# Patient Record
Sex: MALE | Race: ASIAN | NOT HISPANIC OR LATINO | URBAN - METROPOLITAN AREA
[De-identification: names, ages, dates, MRNs, and addresses within clinical notes are randomized per-mention and may not be internally consistent; named-entity substitution may affect disease eponyms.]

---

## 2017-02-10 ENCOUNTER — COMMUNICATION - HEALTHEAST (OUTPATIENT)
Dept: INTERNAL MEDICINE | Facility: CLINIC | Age: 82
End: 2017-02-10

## 2017-02-10 DIAGNOSIS — I10 UNSPECIFIED ESSENTIAL HYPERTENSION: ICD-10-CM

## 2017-04-06 ENCOUNTER — OFFICE VISIT - HEALTHEAST (OUTPATIENT)
Dept: INTERNAL MEDICINE | Facility: CLINIC | Age: 82
End: 2017-04-06

## 2017-04-06 DIAGNOSIS — I10 ESSENTIAL HYPERTENSION: ICD-10-CM

## 2017-04-06 DIAGNOSIS — E78.5 HYPERLIPIDEMIA: ICD-10-CM

## 2017-04-06 DIAGNOSIS — D50.9 IRON DEFICIENCY ANEMIA: ICD-10-CM

## 2017-04-06 LAB
CHOLEST SERPL-MCNC: 219 MG/DL
FASTING STATUS PATIENT QL REPORTED: YES
HDLC SERPL-MCNC: 48 MG/DL
LDLC SERPL CALC-MCNC: 151 MG/DL
TRIGL SERPL-MCNC: 98 MG/DL

## 2017-04-06 ASSESSMENT — MIFFLIN-ST. JEOR: SCORE: 1322.99

## 2017-04-07 ENCOUNTER — COMMUNICATION - HEALTHEAST (OUTPATIENT)
Dept: INTERNAL MEDICINE | Facility: CLINIC | Age: 82
End: 2017-04-07

## 2017-06-14 ENCOUNTER — OFFICE VISIT - HEALTHEAST (OUTPATIENT)
Dept: INTERNAL MEDICINE | Facility: CLINIC | Age: 82
End: 2017-06-14

## 2017-06-14 DIAGNOSIS — I10 ESSENTIAL HYPERTENSION: ICD-10-CM

## 2017-06-14 DIAGNOSIS — R21 RASH/SKIN ERUPTION: ICD-10-CM

## 2017-06-14 DIAGNOSIS — T78.40XA ALLERGY: ICD-10-CM

## 2017-09-14 ENCOUNTER — COMMUNICATION - HEALTHEAST (OUTPATIENT)
Dept: INTERNAL MEDICINE | Facility: CLINIC | Age: 82
End: 2017-09-14

## 2017-09-28 ENCOUNTER — COMMUNICATION - HEALTHEAST (OUTPATIENT)
Dept: INTERNAL MEDICINE | Facility: CLINIC | Age: 82
End: 2017-09-28

## 2018-02-25 ENCOUNTER — COMMUNICATION - HEALTHEAST (OUTPATIENT)
Dept: INTERNAL MEDICINE | Facility: CLINIC | Age: 83
End: 2018-02-25

## 2018-02-28 ENCOUNTER — OFFICE VISIT - HEALTHEAST (OUTPATIENT)
Dept: INTERNAL MEDICINE | Facility: CLINIC | Age: 83
End: 2018-02-28

## 2018-02-28 DIAGNOSIS — J18.9 PNEUMONIA, COMMUNITY ACQUIRED: ICD-10-CM

## 2018-02-28 DIAGNOSIS — I10 ESSENTIAL HYPERTENSION: ICD-10-CM

## 2018-02-28 DIAGNOSIS — B37.0 ORAL THRUSH: ICD-10-CM

## 2018-02-28 ASSESSMENT — MIFFLIN-ST. JEOR: SCORE: 1350.21

## 2018-06-04 ENCOUNTER — OFFICE VISIT - HEALTHEAST (OUTPATIENT)
Dept: INTERNAL MEDICINE | Facility: CLINIC | Age: 83
End: 2018-06-04

## 2018-06-04 ENCOUNTER — RECORDS - HEALTHEAST (OUTPATIENT)
Dept: GENERAL RADIOLOGY | Facility: CLINIC | Age: 83
End: 2018-06-04

## 2018-06-04 DIAGNOSIS — I10 ESSENTIAL HYPERTENSION: ICD-10-CM

## 2018-06-04 DIAGNOSIS — M25.532 LEFT WRIST PAIN: ICD-10-CM

## 2018-06-04 DIAGNOSIS — M25.532 PAIN IN LEFT WRIST: ICD-10-CM

## 2018-06-04 DIAGNOSIS — M79.671 RIGHT FOOT PAIN: ICD-10-CM

## 2018-06-04 DIAGNOSIS — M79.671 PAIN IN RIGHT FOOT: ICD-10-CM

## 2018-06-04 LAB
ANION GAP SERPL CALCULATED.3IONS-SCNC: 13 MMOL/L (ref 5–18)
BUN SERPL-MCNC: 13 MG/DL (ref 8–28)
C REACTIVE PROTEIN LHE: 4.8 MG/DL (ref 0–0.8)
CALCIUM SERPL-MCNC: 9.6 MG/DL (ref 8.5–10.5)
CHLORIDE BLD-SCNC: 105 MMOL/L (ref 98–107)
CO2 SERPL-SCNC: 24 MMOL/L (ref 22–31)
CREAT SERPL-MCNC: 0.91 MG/DL (ref 0.7–1.3)
ERYTHROCYTE [SEDIMENTATION RATE] IN BLOOD BY WESTERGREN METHOD: 12 MM/HR (ref 0–15)
GFR SERPL CREATININE-BSD FRML MDRD: >60 ML/MIN/1.73M2
GLUCOSE BLD-MCNC: 128 MG/DL (ref 70–125)
POTASSIUM BLD-SCNC: 3.8 MMOL/L (ref 3.5–5)
SODIUM SERPL-SCNC: 142 MMOL/L (ref 136–145)
URATE SERPL-MCNC: 7.5 MG/DL (ref 3–8)

## 2018-06-04 ASSESSMENT — MIFFLIN-ST. JEOR: SCORE: 1350.21

## 2018-06-05 ENCOUNTER — COMMUNICATION - HEALTHEAST (OUTPATIENT)
Dept: INTERNAL MEDICINE | Facility: CLINIC | Age: 83
End: 2018-06-05

## 2018-07-26 ENCOUNTER — OFFICE VISIT - HEALTHEAST (OUTPATIENT)
Dept: INTERNAL MEDICINE | Facility: CLINIC | Age: 83
End: 2018-07-26

## 2018-07-26 DIAGNOSIS — I10 ESSENTIAL HYPERTENSION: ICD-10-CM

## 2018-07-26 DIAGNOSIS — M25.532 LEFT WRIST PAIN: ICD-10-CM

## 2018-07-26 DIAGNOSIS — M79.671 RIGHT FOOT PAIN: ICD-10-CM

## 2018-07-26 DIAGNOSIS — E78.5 HYPERLIPIDEMIA: ICD-10-CM

## 2018-07-26 ASSESSMENT — MIFFLIN-ST. JEOR: SCORE: 1332.07

## 2018-10-30 ENCOUNTER — COMMUNICATION - HEALTHEAST (OUTPATIENT)
Dept: INTERNAL MEDICINE | Facility: CLINIC | Age: 83
End: 2018-10-30

## 2018-10-30 ENCOUNTER — OFFICE VISIT - HEALTHEAST (OUTPATIENT)
Dept: INTERNAL MEDICINE | Facility: CLINIC | Age: 83
End: 2018-10-30

## 2018-10-30 DIAGNOSIS — R42 DIZZINESS AND GIDDINESS: ICD-10-CM

## 2018-10-30 DIAGNOSIS — I10 ESSENTIAL HYPERTENSION: ICD-10-CM

## 2018-10-30 DIAGNOSIS — E78.5 HYPERLIPIDEMIA: ICD-10-CM

## 2018-10-30 ASSESSMENT — MIFFLIN-ST. JEOR: SCORE: 1318.46

## 2019-01-01 ENCOUNTER — SURGERY - HEALTHEAST (OUTPATIENT)
Dept: SURGERY | Facility: CLINIC | Age: 84
End: 2019-01-01

## 2019-01-01 ENCOUNTER — COMMUNICATION - HEALTHEAST (OUTPATIENT)
Dept: INTERNAL MEDICINE | Facility: CLINIC | Age: 84
End: 2019-01-01

## 2019-01-01 ENCOUNTER — ANESTHESIA - HEALTHEAST (OUTPATIENT)
Dept: SURGERY | Facility: CLINIC | Age: 84
End: 2019-01-01

## 2019-01-01 ENCOUNTER — OFFICE VISIT - HEALTHEAST (OUTPATIENT)
Dept: INTERNAL MEDICINE | Facility: CLINIC | Age: 84
End: 2019-01-01

## 2019-01-01 ENCOUNTER — RECORDS - HEALTHEAST (OUTPATIENT)
Dept: LAB | Facility: CLINIC | Age: 84
End: 2019-01-01

## 2019-01-01 ENCOUNTER — HOSPITAL ENCOUNTER (OUTPATIENT)
Dept: EMERGENCY MEDICINE | Facility: CLINIC | Age: 84
Discharge: HOME | End: 2019-10-14

## 2019-01-01 ENCOUNTER — RECORDS - HEALTHEAST (OUTPATIENT)
Dept: ADMINISTRATIVE | Facility: OTHER | Age: 84
End: 2019-01-01

## 2019-01-01 ENCOUNTER — HOME CARE/HOSPICE - HEALTHEAST (OUTPATIENT)
Dept: HOSPICE | Facility: HOSPICE | Age: 84
End: 2019-01-01

## 2019-01-01 DIAGNOSIS — E78.00 PURE HYPERCHOLESTEROLEMIA: ICD-10-CM

## 2019-01-01 DIAGNOSIS — I10 ESSENTIAL HYPERTENSION: ICD-10-CM

## 2019-01-01 LAB
ANION GAP SERPL CALCULATED.3IONS-SCNC: 8 MMOL/L (ref 5–18)
BASOPHILS # BLD AUTO: 0.2 THOU/UL (ref 0–0.2)
BASOPHILS NFR BLD AUTO: 1 % (ref 0–2)
BUN SERPL-MCNC: 18 MG/DL (ref 8–28)
CALCIUM SERPL-MCNC: 8.6 MG/DL (ref 8.5–10.5)
CHLORIDE BLD-SCNC: 100 MMOL/L (ref 98–107)
CO2 SERPL-SCNC: 28 MMOL/L (ref 22–31)
CREAT SERPL-MCNC: 0.77 MG/DL (ref 0.7–1.3)
EOSINOPHIL # BLD AUTO: 0.5 THOU/UL (ref 0–0.4)
EOSINOPHIL NFR BLD AUTO: 5 % (ref 0–6)
ERYTHROCYTE [DISTWIDTH] IN BLOOD BY AUTOMATED COUNT: 14.3 % (ref 11–14.5)
GFR SERPL CREATININE-BSD FRML MDRD: >60 ML/MIN/1.73M2
GLUCOSE BLD-MCNC: 120 MG/DL (ref 70–125)
HCT VFR BLD AUTO: 31.5 % (ref 40–54)
HGB BLD-MCNC: 9.3 G/DL (ref 14–18)
LYMPHOCYTES # BLD AUTO: 1 THOU/UL (ref 0.8–4.4)
LYMPHOCYTES NFR BLD AUTO: 9 % (ref 20–40)
MAGNESIUM SERPL-MCNC: 2.4 MG/DL (ref 1.8–2.6)
MCH RBC QN AUTO: 25.8 PG (ref 27–34)
MCHC RBC AUTO-ENTMCNC: 29.5 G/DL (ref 32–36)
MCV RBC AUTO: 87 FL (ref 80–100)
MONOCYTES # BLD AUTO: 0.9 THOU/UL (ref 0–0.9)
MONOCYTES NFR BLD AUTO: 8 % (ref 2–10)
NEUTROPHILS # BLD AUTO: 8.3 THOU/UL (ref 2–7.7)
NEUTROPHILS NFR BLD AUTO: 75 % (ref 50–70)
PHOSPHATE SERPL-MCNC: 3.2 MG/DL (ref 2.5–4.5)
PLATELET # BLD AUTO: 770 THOU/UL (ref 140–440)
PMV BLD AUTO: 9.1 FL (ref 8.5–12.5)
POTASSIUM BLD-SCNC: 3.8 MMOL/L (ref 3.5–5)
RBC # BLD AUTO: 3.61 MILL/UL (ref 4.4–6.2)
SODIUM SERPL-SCNC: 136 MMOL/L (ref 136–145)
WBC: 11 THOU/UL (ref 4–11)

## 2019-01-01 ASSESSMENT — MIFFLIN-ST. JEOR
SCORE: 1283.53
SCORE: 1282.62
SCORE: 1296.68
SCORE: 1274
SCORE: 1288.52
SCORE: 1282.62
SCORE: 1286.25
SCORE: 1287.61
SCORE: 1307.12
SCORE: 1283.53
SCORE: 1296.68
SCORE: 1296.68
SCORE: 1288.52
SCORE: 1304.85
SCORE: 1270.83
SCORE: 1296.68
SCORE: 1271.28
SCORE: 1283.53
SCORE: 1271.28
SCORE: 1287.61
SCORE: 1270.83
SCORE: 1274
SCORE: 1270.55
SCORE: 1336.6
SCORE: 1336.6
SCORE: 1307.12
SCORE: 1296.68
SCORE: 1304.85
SCORE: 1354.74
SCORE: 1296.68
SCORE: 1270.55
SCORE: 1286.25
SCORE: 1354.74
SCORE: 1283.53

## 2020-01-01 ENCOUNTER — HOME CARE/HOSPICE - HEALTHEAST (OUTPATIENT)
Dept: HOSPICE | Facility: HOSPICE | Age: 85
End: 2020-01-01

## 2021-05-25 ENCOUNTER — RECORDS - HEALTHEAST (OUTPATIENT)
Dept: ADMINISTRATIVE | Facility: CLINIC | Age: 86
End: 2021-05-25

## 2021-05-28 ENCOUNTER — RECORDS - HEALTHEAST (OUTPATIENT)
Dept: ADMINISTRATIVE | Facility: CLINIC | Age: 86
End: 2021-05-28

## 2021-05-29 ENCOUNTER — RECORDS - HEALTHEAST (OUTPATIENT)
Dept: ADMINISTRATIVE | Facility: CLINIC | Age: 86
End: 2021-05-29

## 2021-05-30 VITALS — WEIGHT: 170 LBS | HEIGHT: 65 IN | BODY MASS INDEX: 28.32 KG/M2

## 2021-05-30 NOTE — TELEPHONE ENCOUNTER
Refill Approved    Rx renewed per Medication Renewal Policy. Medication was last renewed on 2/25/19.    Last office visit 3/11/19    Arpit Fritz, [facility] Triage/Med Refill 7/30/2019     Requested Prescriptions   Pending Prescriptions Disp Refills     amLODIPine (NORVASC) 2.5 MG tablet [Pharmacy Med Name: AMLODIPINE BESYLATE 2.5MG TABLETS] 30 tablet 0     Sig: TAKE 1 TABLET BY MOUTH EVERY DAY       Calcium-Channel Blockers Protocol Passed - 7/30/2019  3:14 AM        Passed - PCP or prescribing provider visit in past 12 months or next 3 months     Last office visit with prescriber/PCP: 3/11/2019 Ovidio Villanueva MD OR same dept: 3/11/2019 Ovidio Villanueva MD OR same specialty: 3/11/2019 Ovidio Villanueva MD  Last physical: Visit date not found Last MTM visit: Visit date not found   Next visit within 3 mo: Visit date not found  Next physical within 3 mo: Visit date not found  Prescriber OR PCP: Ovidio Villanueva MD  Last diagnosis associated with med order: 1. Essential hypertension  - amLODIPine (NORVASC) 2.5 MG tablet [Pharmacy Med Name: AMLODIPINE BESYLATE 2.5MG TABLETS]; TAKE 1 TABLET BY MOUTH EVERY DAY  Dispense: 30 tablet; Refill: 0    If protocol passes may refill for 12 months if within 3 months of last provider visit (or a total of 15 months).             Passed - Blood pressure filed in past 12 months     BP Readings from Last 1 Encounters:   03/11/19 140/80

## 2021-05-31 ENCOUNTER — RECORDS - HEALTHEAST (OUTPATIENT)
Dept: ADMINISTRATIVE | Facility: CLINIC | Age: 86
End: 2021-05-31

## 2021-05-31 VITALS — BODY MASS INDEX: 28.16 KG/M2 | WEIGHT: 169.25 LBS

## 2021-05-31 NOTE — ANESTHESIA PREPROCEDURE EVALUATION
Anesthesia Evaluation      Patient summary reviewed   No history of anesthetic complications     Airway   Mallampati: III  Neck ROM: full   Pulmonary    (+) decreased breath sounds (B/L),   (-) not a smoker                         Cardiovascular   (+) hypertension, , hypercholesterolemia,     (-) angina  ECG reviewed (NSR)  Rhythm: regular     ROS comment: Walks without assistance per pt     Neuro/Psych - negative ROS     Endo/Other    (+) arthritis,      Comments: Failure to thrive    GI/Hepatic/Renal    (+) esophageal disease (dysphagia),      Comments: Hx of small bowel obstruction - treated (D/C 8/15/19)  Had EGD yesterday     Other findings: Results for orders placed or performed during the hospital encounter of 08/21/19  -Basic Metabolic Panel      Result                      Value             Ref Range           Sodium                      138               136 - 145 mm*       Potassium                   3.9               3.5 - 5.0 mm*       Chloride                    103               98 - 107 mmo*       CO2                         19 (L)            22 - 31 mmol*       Anion Gap, Calculation      16                5 - 18 mmol/L       Glucose                     97                70 - 125 mg/*       Calcium                     9.2               8.5 - 10.5 m*       BUN                         15                8 - 28 mg/dL        Creatinine                  0.81              0.70 - 1.30 *       GFR MDRD Af Amer            >60               >60 mL/min/1*       GFR MDRD Non Af Amer        >60               >60 mL/min/1*    Lab Results       Component                Value               Date                       WBC                      9.3                 08/23/2019                 HGB                      16.0                08/23/2019                 HCT                      49.9                08/23/2019                 MCV                      81                  08/23/2019                 PLT                       363                 08/23/2019                  Dental    (+) upper dentures, poor dentition and lower dentures                         Anesthesia Plan  Planned anesthetic: general endotracheal    ASA 3 - emergent   Induction: intravenous   Anesthetic plan and risks discussed with: patient  Anesthesia plan special considerations: antiemetics,   Post-op plan: routine recovery

## 2021-05-31 NOTE — ANESTHESIA POSTPROCEDURE EVALUATION
Patient: Rian Vasquez  ESOPHAGOGASTRODUODENOSCOPY (EGD) WITH BIOPSIES  Anesthesia type: No value filed.    Patient location: Telemetry/Step Down Unit  Last vitals:   Vitals Value Taken Time   /60 8/23/2019  8:00 PM   Temp 36.8  C (98.3  F) 8/23/2019  8:00 PM   Pulse 86 8/23/2019  9:03 PM   Resp 16 8/23/2019  8:00 PM   SpO2 95 % 8/23/2019  8:00 PM   Vitals shown include unvalidated device data.  Post vital signs: stable  Level of consciousness: awake and responds to simple questions  Post-anesthesia pain: pain controlled  Post-anesthesia nausea and vomiting: no  Pulmonary: unassisted, return to baseline  Cardiovascular: stable and blood pressure at baseline  Hydration: adequate  Anesthetic events: no    QCDR Measures:  ASA# 11 - Sujata-op Cardiac Arrest: ASA11B - Patient did NOT experience unanticipated cardiac arrest  ASA# 12 - Sujata-op Mortality Rate: ASA12B - Patient did NOT die  ASA# 13 - PACU Re-Intubation Rate: ASA13B - Patient did NOT require a new airway mgmt  ASA# 10 - Composite Anes Safety: ASA10A - No serious adverse event    Additional Notes:

## 2021-05-31 NOTE — ANESTHESIA CARE TRANSFER NOTE
Last vitals:   Vitals:    08/24/19 1311   BP: (!) 188/72   Pulse: 82   Resp: 12   Temp: 37.4  C (99.3  F)   SpO2: 100%     Patient's level of consciousness is drowsy  Spontaneous respirations: yes  Maintains airway independently: yes  Dentition unchanged: yes  Oropharynx: oropharynx clear of all foreign objects    QCDR Measures:  ASA# 20 - Surgical Safety Checklist: WHO surgical safety checklist completed prior to induction    PQRS# 430 - Adult PONV Prevention: 4558F - Pt received => 2 anti-emetic agents (different classes) preop & intraop  ASA# 8 - Peds PONV Prevention: NA - Not pediatric patient, not GA or 2 or more risk factors NOT present  PQRS# 424 - Sujata-op Temp Management: 4559F - At least one body temp DOCUMENTED => 35.5C or 95.9F within required timeframe  PQRS# 426 - PACU Transfer Protocol: - Transfer of care checklist used  ASA# 14 - Acute Post-op Pain: ASA14B - Patient did NOT experience pain >= 7 out of 10

## 2021-05-31 NOTE — ANESTHESIA CARE TRANSFER NOTE
Last vitals:   Vitals:    08/23/19 0842   BP: 170/73   Pulse: 92   Resp: 24   Temp: 37.2  C (99  F)   SpO2: 100%     Patient's level of consciousness is awake  Spontaneous respirations: yes  Maintains airway independently: yes  Dentition unchanged: yes  Oropharynx: oropharynx clear of all foreign objects    QCDR Measures:  ASA# 20 - Surgical Safety Checklist: WHO surgical safety checklist completed prior to induction    PQRS# 430 - Adult PONV Prevention: 4558F - Pt received => 2 anti-emetic agents (different classes) preop & intraop  ASA# 8 - Peds PONV Prevention: NA - Not pediatric patient, not GA or 2 or more risk factors NOT present  PQRS# 424 - Sujata-op Temp Management: 4559F - At least one body temp DOCUMENTED => 35.5C or 95.9F within required timeframe  PQRS# 426 - PACU Transfer Protocol: - Transfer of care checklist used  ASA# 14 - Acute Post-op Pain: ASA14B - Patient did NOT experience pain >= 7 out of 10

## 2021-05-31 NOTE — ANESTHESIA PREPROCEDURE EVALUATION
Anesthesia Evaluation      Patient summary reviewed   No history of anesthetic complications     Airway   Mallampati: III  Neck ROM: full   Pulmonary    (+) decreased breath sounds (B/L),   (-) not a smoker                         Cardiovascular   (+) hypertension, , hypercholesterolemia,     (-) angina  ECG reviewed (NSR)  Rhythm: regular     ROS comment: Walks without assistance per pt     Neuro/Psych - negative ROS     Endo/Other    (+) arthritis,      Comments: Failure to thrive    GI/Hepatic/Renal    (+) esophageal disease (dysphagia),      Comments: Hx of small bowel obstruction - treated (D/C 8/15/19)     Other findings:     Results for JEM ANAND (MRN [id number]) as of 8/23/2019 8/22/2019 07:46  Sodium: 139  Potassium: 3.9  Chloride: 104  CO2: 27  Anion Gap, Calculation: 8  BUN: 11  Creatinine: 0.88  GFR MDRD Af Amer: >60  GFR MDRD Non Af Amer: >60  Calcium: 8.8  AST: 25  ALT: 39  ALBUMIN: 3.0 (L)  Protein, Total: 6.6  Alkaline Phosphatase: 196 (H)  Bilirubin, Total: 0.8  Bilirubin, Direct: 0.3  Glucose: 104  WBC: 13.9 (H)  RBC: 5.67  Hemoglobin: 14.5  Hematocrit: 45.6  MCV: 80  MCH: 25.6 (L)  MCHC: 31.8 (L)  RDW: 14.6 (H)  Platelets: 320  MPV: 8.7    8/22/2019 10:59  Lactic Acid: 1.3        Dental    (+) upper dentures, poor dentition and lower dentures                       Anesthesia Plan  Planned anesthetic: MAC    ASA 3     Anesthetic plan and risks discussed with: patient  Anesthesia plan special considerations: antiemetics,   Post-op plan: routine recovery

## 2021-05-31 NOTE — ANESTHESIA POSTPROCEDURE EVALUATION
Patient: Rian aVsquez  LYSIS, ADHESIONS, LAPAROSCOPIC, INSERTION, JEJUNOSTOMY TUBE, LAPAROSCOPIC  Anesthesia type: general    Patient location: PACU  Last vitals:   Vitals Value   /68   Temp 36.8  C (98.2  F)   Pulse 100   Resp 16   SpO2 94 %   Vitals shown include unvalidated device data.  Post vital signs: stable  Level of consciousness: awake and responds to simple questions  Post-anesthesia pain: pain controlled  Post-anesthesia nausea and vomiting: no  Pulmonary: unassisted, return to baseline  Cardiovascular: stable and blood pressure at baseline  Hydration: adequate  Anesthetic events: no    QCDR Measures:  ASA# 11 - Sujata-op Cardiac Arrest: ASA11B - Patient did NOT experience unanticipated cardiac arrest  ASA# 12 - Sujata-op Mortality Rate: ASA12B - Patient did NOT die  ASA# 13 - PACU Re-Intubation Rate: ASA13B - Patient did NOT require a new airway mgmt  ASA# 10 - Composite Anes Safety: ASA10A - No serious adverse event    Additional Notes:

## 2021-06-01 VITALS — WEIGHT: 165 LBS | BODY MASS INDEX: 25.9 KG/M2 | HEIGHT: 67 IN

## 2021-06-01 VITALS — HEIGHT: 67 IN | BODY MASS INDEX: 26.53 KG/M2 | WEIGHT: 169 LBS

## 2021-06-01 VITALS — WEIGHT: 169 LBS | HEIGHT: 67 IN | BODY MASS INDEX: 26.53 KG/M2

## 2021-06-02 ENCOUNTER — RECORDS - HEALTHEAST (OUTPATIENT)
Dept: ADMINISTRATIVE | Facility: CLINIC | Age: 86
End: 2021-06-02

## 2021-06-02 VITALS — HEIGHT: 67 IN | BODY MASS INDEX: 26.06 KG/M2 | WEIGHT: 166 LBS

## 2021-06-02 VITALS — HEIGHT: 67 IN | WEIGHT: 162 LBS | BODY MASS INDEX: 25.43 KG/M2

## 2021-06-02 VITALS — BODY MASS INDEX: 26.06 KG/M2 | WEIGHT: 166 LBS | HEIGHT: 67 IN

## 2021-06-02 NOTE — TELEPHONE ENCOUNTER
Refill Approved    Rx renewed per Medication Renewal Policy. Medication was last renewed on 9/4/19 .    Lori Almonte, [facility] Triage/Med Refill 10/26/2019     Requested Prescriptions   Pending Prescriptions Disp Refills     amLODIPine (NORVASC) 2.5 MG tablet [Pharmacy Med Name: AMLODIPINE BESYLATE 2.5MG TABLETS] 90 tablet 0     Sig: TAKE 1 TABLET BY MOUTH EVERY DAY       Calcium-Channel Blockers Protocol Passed - 10/26/2019  3:13 AM        Passed - PCP or prescribing provider visit in past 12 months or next 3 months     Last office visit with prescriber/PCP: 3/11/2019 Ovidio Villanueva MD OR same dept: 3/11/2019 Ovidio Villanueva MD OR same specialty: 3/11/2019 Ovidio Villanueva MD  Last physical: Visit date not found Last MTM visit: Visit date not found   Next visit within 3 mo: Visit date not found  Next physical within 3 mo: Visit date not found  Prescriber OR PCP: Ovdiio Villanueva MD  Last diagnosis associated with med order: 1. Essential hypertension  - amLODIPine (NORVASC) 2.5 MG tablet [Pharmacy Med Name: AMLODIPINE BESYLATE 2.5MG TABLETS]; TAKE 1 TABLET BY MOUTH EVERY DAY  Dispense: 90 tablet; Refill: 0    If protocol passes may refill for 12 months if within 3 months of last provider visit (or a total of 15 months).             Passed - Blood pressure filed in past 12 months     BP Readings from Last 1 Encounters:   10/03/19 140/64

## 2021-06-03 VITALS
WEIGHT: 151.6 LBS | HEIGHT: 67 IN | BODY MASS INDEX: 23.79 KG/M2 | WEIGHT: 151.6 LBS | BODY MASS INDEX: 23.79 KG/M2 | HEIGHT: 67 IN

## 2021-06-09 NOTE — PROGRESS NOTES
Office Visit - Follow Up   Rian Vasquez   89 y.o. male    Date of Visit: 4/6/2017    Chief Complaint   Patient presents with     Follow-up     Fasting for labs today        Assessment and Plan   1. Essential hypertension  Controlled.  Continue amlodipine, controlled.  Metoprolol and hydrochlorothiazide.  Check basic metabolic profile.  - Basic Metabolic Panel    2. Hyperlipidemia  Does not take medication but controlled.  Check lipids TSH and liver.  - Lipid Cascade  - Thyroid Stimulating Hormone (TSH)  - Hepatic Profile    3. Iron deficiency anemia  Has iron deficiency anemia but now corrected by iron supplement.  Takes iron supplement daily.  Check CBC, ferritin and iron.  - HM2(CBC w/o Differential)  - Ferritin  - Iron    Doing well for his advanced age of 89.  Does not look like his age of 89 years old.  Very active.  Still dances in the ballroom 3 times a week.      Follow up in 4 months.       History of Present Illness   This 89 y.o. old male is here for his follow-up.  Has hypertension controlled by his medications.  Has hyperlipidemia controlled without medication.  Has iron deficiency anemia but now corrected.  Continues to take iron supplement daily.  Still very active at his advanced age of 89 years old.  He dances in a ballroom 3 times a week.  Denies chest pains, shortness of breath and easy fatigue.  Does not have urinary or bowel symptoms.  Overall feels well.    Review of Systems   A 12 point comprehensive review of systems was negative except as noted..     Medications, Allergies and Problem List   Reviewed and updated             Chief Complaint   Follow-up (Fasting for labs today)       Patient Profile   Social History     Social History Narrative        Past Medical History   Patient Active Problem List   Diagnosis     Dizziness     Hyperlipidemia     Essential Hypertension       Past Surgical History  He has a past surgical history that includes transurethral elec-surg prostatectom  "and removal gallbladder.       Medications and Allergies   Current Outpatient Prescriptions   Medication Sig     amLODIPine (NORVASC) 2.5 MG tablet TAKE ONE TABLET BY MOUTH DAILY     aspirin 81 MG EC tablet Take 81 mg by mouth daily.     calcium-vitamin D 500 mg(1,250mg) -200 unit per tablet Take 1 tablet by mouth 2 (two) times a day with meals.     ferrous sulfate 325 (65 FE) MG tablet Take 325 mg by mouth daily.     hydroCHLOROthiazide (HYDRODIURIL) 25 MG tablet Take 1 tablet (25 mg total) by mouth 2 (two) times a day at 9am and 6pm.     metoprolol succinate (TOPROL-XL) 50 MG 24 hr tablet TAKE 1 TABLET BY MOUTH DAILY     multivitamin (MULTIVITAMIN) per tablet Take 1 tablet by mouth daily.     potassium chloride SA (K-DUR,KLOR-CON) 20 MEQ tablet TAKE ONE TABLET(20MEQ) DAILY     No Known Allergies     Family and Social History   No family history on file.     Social History   Substance Use Topics     Smoking status: Never Smoker     Smokeless tobacco: Never Used     Alcohol use No        Physical Exam       Physical Exam  /78  Pulse 70  Resp 18  Ht 5' 5\" (1.651 m)  Wt 170 lb (77.1 kg)  BMI 28.29 kg/m2  General appearance: alert, appears stated age, cooperative, no distress and looks younger than his age  Head: Normocephalic, without obvious abnormality, atraumatic  Throat: lips, mucosa, and tongue normal; teeth and gums normal  Neck: no adenopathy, no carotid bruit, no JVD, supple, symmetrical, trachea midline and thyroid not enlarged, symmetric, no tenderness/mass/nodules  Lungs: clear to auscultation bilaterally  Heart: regular rate and rhythm, S1, S2 normal, no murmur, click, rub or gallop  Abdomen: soft, non-tender; bowel sounds normal; no masses,  no organomegaly  Extremities: extremities normal, atraumatic, no cyanosis or edema  Skin: Skin color, texture, turgor normal. No rashes or lesions  Neurologic: Grossly normal     Additional Information        Ovidio Villanueva MD  Internal " Medicine  Contact me at [phone]     Additional Information   Current Outpatient Prescriptions   Medication Sig     amLODIPine (NORVASC) 2.5 MG tablet TAKE ONE TABLET BY MOUTH DAILY     aspirin 81 MG EC tablet Take 81 mg by mouth daily.     calcium-vitamin D 500 mg(1,250mg) -200 unit per tablet Take 1 tablet by mouth 2 (two) times a day with meals.     ferrous sulfate 325 (65 FE) MG tablet Take 325 mg by mouth daily.     hydroCHLOROthiazide (HYDRODIURIL) 25 MG tablet Take 1 tablet (25 mg total) by mouth 2 (two) times a day at 9am and 6pm.     metoprolol succinate (TOPROL-XL) 50 MG 24 hr tablet TAKE 1 TABLET BY MOUTH DAILY     multivitamin (MULTIVITAMIN) per tablet Take 1 tablet by mouth daily.     potassium chloride SA (K-DUR,KLOR-CON) 20 MEQ tablet TAKE ONE TABLET(20MEQ) DAILY     No Known Allergies  Social History   Substance Use Topics     Smoking status: Never Smoker     Smokeless tobacco: Never Used     Alcohol use No         Time: total time spent with the patient was 25 minutes of which >50% was spent in counseling and coordination of care

## 2021-06-16 PROBLEM — E87.20 LACTIC ACIDOSIS: Status: ACTIVE | Noted: 2019-01-01

## 2021-06-16 PROBLEM — A41.9 SEPTIC SHOCK (H): Status: ACTIVE | Noted: 2019-01-01

## 2021-06-16 PROBLEM — E78.00 PURE HYPERCHOLESTEROLEMIA: Status: ACTIVE | Noted: 2019-01-01

## 2021-06-16 PROBLEM — R11.2 NAUSEA AND VOMITING: Status: ACTIVE | Noted: 2019-01-01

## 2021-06-16 PROBLEM — R62.7 FAILURE TO THRIVE IN ADULT: Status: ACTIVE | Noted: 2019-01-01

## 2021-06-16 PROBLEM — R65.21 SEPTIC SHOCK (H): Status: ACTIVE | Noted: 2019-01-01
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
## 2021-06-16 PROBLEM — R65.20 SEVERE SEPSIS (H): Status: ACTIVE | Noted: 2019-01-01

## 2021-06-16 PROBLEM — R13.10 DYSPHAGIA: Status: ACTIVE | Noted: 2019-01-01

## 2021-06-16 PROBLEM — A41.9 SEVERE SEPSIS (H): Status: ACTIVE | Noted: 2019-01-01

## 2021-06-16 PROBLEM — K56.609 SBO (SMALL BOWEL OBSTRUCTION) (H): Status: ACTIVE | Noted: 2019-01-01

## 2021-06-16 PROBLEM — I95.9 HYPOTENSION: Status: ACTIVE | Noted: 2019-01-01

## 2021-06-16 PROBLEM — T18.128A ESOPHAGEAL OBSTRUCTION DUE TO FOOD IMPACTION: Status: ACTIVE | Noted: 2019-01-01

## 2021-06-16 PROBLEM — D72.825 BANDEMIA: Status: ACTIVE | Noted: 2018-02-23

## 2021-06-16 PROBLEM — I77.74 DISSECTION, VERTEBRAL ARTERY (H): Status: ACTIVE | Noted: 2019-01-01

## 2021-06-16 NOTE — PROGRESS NOTES
Office Visit - Follow Up   Rian Vasquez   89 y.o. male    Date of Visit: 2/28/2018    Chief Complaint   Patient presents with     Hospital Visit Follow Up     [facility] for pneumonia        Assessment and Plan   1. Pneumonia, community acquired  Coughing for a few days and was short of breath.  Finally went to the emergency room on 2/23/2018.  Was found to have community-acquired pneumonia confirmed by chest x-ray showing left infiltrates versus atelectasis.  Did not have fever.  Treated with nebulizer oxygen and steroid antibiotic.  Did well.  Now continuing with oral doxycycline 100 mg 2 times a day for 7 days.  Does not have shortness of breath and cough anymore.    2. Essential hypertension  Controlled.  Continue hydrochlorothiazide.    3. Oral thrush  Was found to have mild oral thrush.  Was treated with oral antifungal bell.  Now it is resolved.    Reviewed emergency room MD notes, hospitalist notes and workup including labs and chest x-ray.      Follow up in 4 months.     History of Present Illness   This 89 y.o. old male is here for hospital follow-up.  Had cough and shortness of breath.  Did not have fever.  Cough was persistent.  Got concerned because he was short of breath.  Went to the emergency room on 2/20/2018.  Was found to have community-acquired pneumonia conferred by chest x-ray.  Was treated with oxygen, nebulizer steroid and antibiotic.  Sent home on 2/25/2018 with doxycycline and now finishing it for a few more days days.  Doing and feeling well now.  Does not have any complaints.    Review of Systems   A 12 point comprehensive review of systems was negative except as noted..     Medications, Allergies and Problem List   Reviewed and updated             Chief Complaint   Hospital Visit Follow Up ([facility]/ for pneumonia)       Patient Profile   Social History     Social History Narrative        Past Medical History   Patient Active Problem List   Diagnosis      "Dizziness     Hyperlipidemia     Essential hypertension     Hypokalemia     Pneumonia, community acquired     Bandemia     Thrush of mouth and esophagus       Past Surgical History  He has a past surgical history that includes pr transurethral elec-surg prostatectom and pr removal gallbladder.       Medications and Allergies   Current Outpatient Prescriptions   Medication Sig     aspirin 325 MG tablet Take 325 mg by mouth daily.     doxycycline (VIBRA-TABS) 100 MG tablet Take 1 tablet (100 mg total) by mouth 2 (two) times a day for 5 days.     GLY/DIMETH/PETROLAT,WHT/WATER (MOISTURIZING CREAM TOP) Apply topically as needed.     hydroCHLOROthiazide (HYDRODIURIL) 25 MG tablet Take 25 mg by mouth 2 (two) times a day at 9am and 6pm.     multivitamin (MULTIVITAMIN) per tablet Take 1 tablet by mouth daily.     No Known Allergies     Family and Social History   No family history on file.     Social History   Substance Use Topics     Smoking status: Never Smoker     Smokeless tobacco: Never Used     Alcohol use No        Physical Exam       Physical Exam  /80  Pulse 96  Ht 5' 7\" (1.702 m)  Wt 169 lb (76.7 kg)  SpO2 94%  BMI 26.47 kg/m2  General appearance: alert, appears stated age, cooperative and no distress  Head: Normocephalic, without obvious abnormality, atraumatic  Nose: Nares normal. Septum midline. Mucosa normal. No drainage or sinus tenderness.  Throat: lips, mucosa, and tongue normal; teeth and gums normal, no whitish discoloration of tongue and mouth  Lungs: clear to auscultation bilaterally  Heart: regular rate and rhythm, S1, S2 normal, no murmur, click, rub or gallop  Abdomen: soft, non-tender; bowel sounds normal; no masses,  no organomegaly  Extremities: extremities normal, atraumatic, no cyanosis or edema  Skin: Skin color, texture, turgor normal. No rashes or lesions  Neurologic: Grossly normal     Additional Information        Ovidio Villanueva MD  Internal Medicine  Contact me at " [phone]     Additional Information   Current Outpatient Prescriptions   Medication Sig     aspirin 325 MG tablet Take 325 mg by mouth daily.     doxycycline (VIBRA-TABS) 100 MG tablet Take 1 tablet (100 mg total) by mouth 2 (two) times a day for 5 days.     GLY/DIMETH/PETROLAT,WHT/WATER (MOISTURIZING CREAM TOP) Apply topically as needed.     hydroCHLOROthiazide (HYDRODIURIL) 25 MG tablet Take 25 mg by mouth 2 (two) times a day at 9am and 6pm.     multivitamin (MULTIVITAMIN) per tablet Take 1 tablet by mouth daily.     No Known Allergies  Social History   Substance Use Topics     Smoking status: Never Smoker     Smokeless tobacco: Never Used     Alcohol use No         Time: total time spent with the patient was 40 minutes of which >50% was spent in counseling and coordination of care

## 2021-06-18 NOTE — PROGRESS NOTES
Office Visit - Follow Up   Rian Vasquez   89 y.o. male    Date of Visit: 6/4/2018    Chief Complaint   Patient presents with     Edema     Complains of swelling in L hand and R foot, increased redness and pain        Assessment and Plan   1. Right foot pain  Complains of right foot pains which started about 3-4 days ago.  Localized on the mid foot dorsal surface.  Noted some swelling  main some redness.  But today  the redness and pains seem to have improved.  Did x-ray of the right foot and on my personal review it is normal.  Check uric acid sed rate and C-reactive protein to rule out  gout.  I am suspecting this is due to gout.  Will treat with Celebrex 100 mg daily until  his pains resolved.  - XR Foot Right 3 or More VWS; Future  - Uric Acid  - Erythrocyte Sedimentation Rate  - C-Reactive Protein  - celecoxib (CELEBREX) 100 MG capsule; Take 1 capsule (100 mg total) by mouth daily.  Dispense: 30 capsule; Refill: 1    2. Left wrist pain  Also complained of  left wrist pains which started at the same time  he had right foot pains.  Localized on the  the left wrist alone.  Noted some redness, mild swelling and certainly tenderness.  Also I suspect this is due to gout.  Did his left wrist x-ray and on my personal review  it is normal.  Check uric acid, sed rate and C-reactive protein to rule out occult.  Will treat with Celebrex 100 mg daily same as twice right foot pains until his pains are resolved.  - XR Wrist Left 3 or More VWS; Future  - Uric Acid  - Erythrocyte Sedimentation Rate  - C-Reactive Protein  - celecoxib (CELEBREX) 100 MG capsule; Take 1 capsule (100 mg total) by mouth daily.  Dispense: 30 capsule; Refill: 1    3. Essential hypertension  Stopped taking his hydrochlorothiazide.  Blood pressure still remains normal.  Will continue to follow this.  For now  okay not to take hydrochlorothiazide.  Check basic metabolic panel.  - Basic Metabolic Panel    Still active and independent despite his  advanced age of  89 years old.  Able to do all chores and activities without help.      Follow up in 3 months for his blood pressure.     History of Present Illness   This 89 y.o. old male  complains of right foot pains and left wrist pains which started 3-4 days ago.  No injury or trauma.  Suddenly felt pain and noted some redness, warmth and mild swelling.  Right foot pains are localized on the on the mid third dorsal surface of the right foot.  Some tenderness but  does not have anymore swelling, redness or warmth.  Left wrist is mildly swollen warm and tender.  Suspect his right foot pains and left wrist pains are due to gout.  Has hypertension.  But stopped taking his hydrochlorothiazide for months now.  Blood pressure still remains controlled.  So I do not think we need to resume his blood pressure medication at this point.  We will continue to follow.  Overall feels well.  Still very independent and physically active despite his advanced age of 89 years old.    Review of Systems   A 12 point comprehensive review of systems was negative except as noted..     Medications, Allergies and Problem List   Reviewed and updated             Chief Complaint   Edema (Complains of swelling in L hand and R foot, increased redness and pain)       Patient Profile   Social History     Social History Narrative        Past Medical History   Patient Active Problem List   Diagnosis     Dizziness     Hyperlipidemia     Essential hypertension     Hypokalemia     Bandemia       Past Surgical History  He has a past surgical history that includes pr transurethral elec-surg prostatectom and pr removal gallbladder.       Medications and Allergies   Current Outpatient Prescriptions   Medication Sig     aspirin 325 MG tablet Take 325 mg by mouth daily.     GLY/DIMETH/PETROLAT,WHT/WATER (MOISTURIZING CREAM TOP) Apply topically as needed.     multivitamin (MULTIVITAMIN) per tablet Take 1 tablet by mouth daily.     celecoxib (CELEBREX) 100  "MG capsule Take 1 capsule (100 mg total) by mouth daily.     No Known Allergies     Family and Social History   No family history on file.     Social History   Substance Use Topics     Smoking status: Never Smoker     Smokeless tobacco: Never Used     Alcohol use No        Physical Exam       Physical Exam  /80  Pulse (!) 107  Ht 5' 7\" (1.702 m)  Wt 169 lb (76.7 kg)  SpO2 96%  BMI 26.47 kg/m2  General appearance: alert, appears stated age, cooperative and no distress  Head: Normocephalic, without obvious abnormality, atraumatic  Throat: lips, mucosa, and tongue normal; teeth and gums normal  Neck: no adenopathy, no carotid bruit, no JVD, supple, symmetrical, trachea midline and thyroid not enlarged, symmetric, no tenderness/mass/nodules  Lungs: clear to auscultation bilaterally  Heart: regular rate and rhythm, S1, S2 normal, no murmur, click, rub or gallop  Abdomen: soft, non-tender; bowel sounds normal; no masses,  no organomegaly  Extremities: extremities normal, atraumatic, no cyanosis or edema  Skin: Skin color, texture, turgor normal. No rashes or lesions  Neurologic: Grossly normal  Musculoskeletal: Right foot some tenderness on palpation over the mid third dorsal tarsal plate, left wrist  Is mildly swollen, tender, warm     Additional Information        Ovidio Villanueva MD  Internal Medicine  Contact me at [phone]     Additional Information   Current Outpatient Prescriptions   Medication Sig     aspirin 325 MG tablet Take 325 mg by mouth daily.     GLY/DIMETH/PETROLAT,WHT/WATER (MOISTURIZING CREAM TOP) Apply topically as needed.     multivitamin (MULTIVITAMIN) per tablet Take 1 tablet by mouth daily.     celecoxib (CELEBREX) 100 MG capsule Take 1 capsule (100 mg total) by mouth daily.     No Known Allergies  Social History   Substance Use Topics     Smoking status: Never Smoker     Smokeless tobacco: Never Used     Alcohol use No         Time: total time spent with the patient was 25 minutes " of which >50% was spent in counseling and coordination of care

## 2021-06-19 NOTE — PROGRESS NOTES
Office Visit - Follow Up   Rian Vasquez   89 y.o. male    Date of Visit: 7/26/2018    Chief Complaint   Patient presents with     Follow-up     not fasting         Assessment and Plan   1. Right foot pain  Has off-and-on right foot pains localized on the distal part of the foot dorsal surface just below the first and second toes.  Concerned with possible gout.  Has increased uric acid of 7.5 on 6/4/2018.  Cannot rule out gout.  Will treat first with Celebrex daily when he has foot pains.  Does not have recurring distal foot pains so he does not need prophylactic allopurinol yet..  - celecoxib (CELEBREX) 100 MG capsule; Take 1 capsule (100 mg total) by mouth daily.  Dispense: 30 capsule; Refill: 1    2. Essential hypertension  Controlled.  Continue low-fat diet, low-salt and regular exercise.  Does not need medication to control blood pressure now.    3. Hyperlipidemia  Controlled.  No need to treat with statin because of his advanced age of   89 years.  But last lipids were good      Follow up in 4 months.     History of Present Illness   This 89 y.o. old male complains of some right foot pains.  Come off and on.  Localized on the distal third of the right foot dorsal surface just below the first and second great toes.  Concerned with possible gout.  Has increased uric acid on his last visit was 7.5.  Pains are not intense or severe.  Today the pains are gone.  There are no redness warmth and significant tenderness to palpation of the first and second toe of the right foot.  Has hyperlipidemia controlled without need for medication.  Does not need a statin because of his advanced age even if his lipids are increased.  Has hypertension also controlled without need for medication.  Overall, feels well despite his advanced age of 89 years old.    Review of Systems   A 12 point comprehensive review of systems was negative except as noted..     Medications, Allergies and Problem List   Reviewed and updated  "            Chief Complaint   Follow-up (not fasting )       Patient Profile   Social History     Social History Narrative        Past Medical History   Patient Active Problem List   Diagnosis     Dizziness     Hyperlipidemia     Essential hypertension     Hypokalemia     Bandemia       Past Surgical History  He has a past surgical history that includes pr transurethral elec-surg prostatectom and pr removal gallbladder.       Medications and Allergies   Current Outpatient Prescriptions   Medication Sig     aspirin 325 MG tablet Take 325 mg by mouth daily.     celecoxib (CELEBREX) 100 MG capsule Take 1 capsule (100 mg total) by mouth daily.     GLY/DIMETH/PETROLAT,WHT/WATER (MOISTURIZING CREAM TOP) Apply topically as needed.     multivitamin (MULTIVITAMIN) per tablet Take 1 tablet by mouth daily.     No Known Allergies     Family and Social History   No family history on file.     Social History   Substance Use Topics     Smoking status: Never Smoker     Smokeless tobacco: Never Used     Alcohol use No        Physical Exam       Physical Exam  /80  Pulse 89  Ht 5' 7\" (1.702 m)  Wt 165 lb (74.8 kg)  SpO2 95%  BMI 25.84 kg/m2  General appearance: alert, appears stated age and cooperative  Head: Normocephalic, without obvious abnormality, atraumatic  Throat: lips, mucosa, and tongue normal; teeth and gums normal  Neck: no adenopathy, no carotid bruit, no JVD, supple, symmetrical, trachea midline and thyroid not enlarged, symmetric, no tenderness/mass/nodules  Lungs: clear to auscultation bilaterally  Heart: regular rate and rhythm, S1, S2 normal, no murmur, click, rub or gallop  Abdomen: soft, non-tender; bowel sounds normal; no masses,  no organomegaly  Extremities: extremities normal, atraumatic, no cyanosis or edema  Skin: Skin color, texture, turgor normal. No rashes or lesions  Musculoskeletal: Normal right foot exam, no redness, warmth, swelling and tenderness     Additional Information        Ovidio CHUNG" MD Rich  Internal Medicine  Contact me at [phone]     Additional Information   Current Outpatient Prescriptions   Medication Sig     aspirin 325 MG tablet Take 325 mg by mouth daily.     celecoxib (CELEBREX) 100 MG capsule Take 1 capsule (100 mg total) by mouth daily.     GLY/DIMETH/PETROLAT,WHT/WATER (MOISTURIZING CREAM TOP) Apply topically as needed.     multivitamin (MULTIVITAMIN) per tablet Take 1 tablet by mouth daily.     No Known Allergies  Social History   Substance Use Topics     Smoking status: Never Smoker     Smokeless tobacco: Never Used     Alcohol use No         Time: total time spent with the patient was 25 minutes of which >50% was spent in counseling and coordination of care

## 2021-06-21 NOTE — PROGRESS NOTES
Office Visit - Follow Up   Rian Vasquez   89 y.o. male    Date of Visit: 10/30/2018    Chief Complaint   Patient presents with     Follow-up     3 month        Assessment and Plan   1. Essential hypertension  Controlled without need for medication.    2. Hyperlipidemia  Controlled.  Lipids on 4/6/2017 were fine , total cholesterol 219 and normal HDL and triglycerides.  No need for statin treatment because of his advanced age of 89.    3. Dizziness  Does not experience recurring dizziness.  Hence, not a problem anymore    Already got his flu shot.  Up-to-date with his other preventive screening.      Follow up in 4 months.     History of Present Illness   This 89 y.o. old male is here for follow-up.  Doing well despite his advanced age of 89 years old.  Only takes aspirin and multivitamins.  Does not take prescription medications.  Physically active.  Still does ballroom dancing.  Denies chest pains and shortness of breath.  Denies urinary and bowel symptoms.  Used to be dizzy intermittently.  But not a problem anymore.  Also denies aches and pains now.  Overall, doing and feeling well.  No complaints.    Review of Systems   A 12 point comprehensive review of systems was negative except as noted..     Medications, Allergies and Problem List   Reviewed and updated             Chief Complaint   Follow-up (3 month)       Patient Profile   Social History     Social History Narrative        Past Medical History   Patient Active Problem List   Diagnosis     Dizziness     Hyperlipidemia     Essential hypertension     Hypokalemia     Bandemia       Past Surgical History  He has a past surgical history that includes pr transurethral elec-surg prostatectom and pr removal gallbladder.       Medications and Allergies   Current Outpatient Prescriptions   Medication Sig     aspirin 325 MG tablet Take 325 mg by mouth daily.     celecoxib (CELEBREX) 100 MG capsule Take 1 capsule (100 mg total) by mouth daily.      "GLY/DIMETH/PETROLAT,WHT/WATER (MOISTURIZING CREAM TOP) Apply topically as needed.     multivitamin (MULTIVITAMIN) per tablet Take 1 tablet by mouth daily.     No Known Allergies     Family and Social History   No family history on file.     Social History   Substance Use Topics     Smoking status: Never Smoker     Smokeless tobacco: Never Used     Alcohol use No        Physical Exam       Physical Exam  /70  Pulse 77  Ht 5' 7\" (1.702 m)  Wt 162 lb (73.5 kg)  SpO2 95%  BMI 25.37 kg/m2  General appearance: alert, cooperative, no distress and looks very much younger than his age  Head: Normocephalic, without obvious abnormality, atraumatic  Eyes: conjunctivae/corneas clear. PERRL, EOM's intact. Fundi benign.  Ears: normal TM's and external ear canals both ears  Nose: Nares normal. Septum midline. Mucosa normal. No drainage or sinus tenderness.  Throat: lips, mucosa, and tongue normal; teeth and gums normal  Neck: no adenopathy, no carotid bruit, no JVD, supple, symmetrical, trachea midline and thyroid not enlarged, symmetric, no tenderness/mass/nodules  Lungs: clear to auscultation bilaterally  Heart: regular rate and rhythm, S1, S2 normal, no murmur, click, rub or gallop  Abdomen: soft, non-tender; bowel sounds normal; no masses,  no organomegaly  Extremities: extremities normal, atraumatic, no cyanosis or edema  Skin: Skin color, texture, turgor normal. No rashes or lesions  Neurologic: Grossly normal  Musculoskeletal: Normal     Additional Information        Ovidio Villanueva MD  Internal Medicine  Contact me at [phone]     Additional Information   Current Outpatient Prescriptions   Medication Sig     aspirin 325 MG tablet Take 325 mg by mouth daily.     celecoxib (CELEBREX) 100 MG capsule Take 1 capsule (100 mg total) by mouth daily.     GLY/DIMETH/PETROLAT,WHT/WATER (MOISTURIZING CREAM TOP) Apply topically as needed.     multivitamin (MULTIVITAMIN) per tablet Take 1 tablet by mouth daily.     No " Known Allergies  Social History   Substance Use Topics     Smoking status: Never Smoker     Smokeless tobacco: Never Used     Alcohol use No         Time: total time spent with the patient was 25 minutes of which >50% was spent in counseling and coordination of care

## 2021-06-24 NOTE — PROGRESS NOTES
Office Visit - Follow Up   Rian Vasquez   89 y.o. male    Date of Visit: 2/25/2019    Chief Complaint   Patient presents with     Follow-up     took BP at home 170's on top and unable to tell me bottom number, declines any other symptoms        Assessment and Plan   1. Essential hypertension  Noticed his blood pressure was increased to 170 systolic in the last several days.  Came to see me for this.  Used to take  hydrochlorothiazide in the past.  Stopped taking it.  Developed hypokalemia from this diuretic.  When this was stopped his blood pressure remained normal.  Now it is starting to creep up on him again.  He is 89 years old.  So will start him on low-dose amlodipine.  Will see him in 2 weeks for follow-up.  - amLODIPine (NORVASC) 2.5 MG tablet; Take 1 tablet (2.5 mg total) by mouth daily.  Dispense: 30 tablet; Refill: 6    2. Pure hypercholesterolemia  Has pure  hypercholesterolemia, controlled without need for  a medication.  Last lipids were borderline but because he is more than 89 years old he does not need a lipid-lowering medication.      Follow up in 2 weeks.     History of Present Illness   This 89 y.o. old male is here for note of increased systolic blood pressure when he checked it intermittently.  Note that the last several days he has been running 170 systolic.  Used to take a diuretic, hydrochlorothiazide.  Was stopped because he developed hypokalemia and his blood pressure remained normal even without this medication.  Has hyperlipidemia.  But not on a statin because of his advanced age of 89 years old.  Overall feels well.  Still very functional and independent despite his advanced age of 89 years old.      Review of Systems   A 12 point comprehensive review of systems was negative except as noted..     Medications, Allergies and Problem List   Reviewed and updated             Chief Complaint   Follow-up (took BP at home 170's on top and unable to tell me bottom number, declines any  "other symptoms)       Patient Profile   Social History     Social History Narrative     Not on file        Past Medical History   Patient Active Problem List   Diagnosis     Dizziness     Hyperlipidemia     Essential hypertension     Hypokalemia     Bandemia       Past Surgical History  He has a past surgical history that includes pr transurethral elec-surg prostatectom and pr removal gallbladder.       Medications and Allergies   Current Outpatient Medications   Medication Sig     aspirin 325 MG tablet Take 325 mg by mouth daily.     celecoxib (CELEBREX) 100 MG capsule Take 1 capsule (100 mg total) by mouth daily.     GLY/DIMETH/PETROLAT,WHT/WATER (MOISTURIZING CREAM TOP) Apply topically as needed.     multivitamin (MULTIVITAMIN) per tablet Take 1 tablet by mouth daily.     amLODIPine (NORVASC) 2.5 MG tablet Take 1 tablet (2.5 mg total) by mouth daily.     No Known Allergies     Family and Social History   No family history on file.     Social History     Tobacco Use     Smoking status: Never Smoker     Smokeless tobacco: Never Used   Substance Use Topics     Alcohol use: No     Drug use: No        Physical Exam       Physical Exam  /90   Pulse 90   Ht 5' 7\" (1.702 m)   Wt 166 lb (75.3 kg)   SpO2 97%   BMI 26.00 kg/m    General appearance: alert, appears stated age, cooperative and no distress, looks younger than his age.    Head: Normocephalic, without obvious abnormality, atraumatic  Throat: lips, mucosa, and tongue normal; teeth and gums normal  Neck: no adenopathy, no carotid bruit, no JVD, supple, symmetrical, trachea midline and thyroid not enlarged, symmetric, no tenderness/mass/nodules  Lungs: clear to auscultation bilaterally  Heart: regular rate and rhythm, S1, S2 normal, no murmur, click, rub or gallop  Abdomen: soft, non-tender; bowel sounds normal; no masses,  no organomegaly  Extremities: extremities normal, atraumatic, no cyanosis or edema  Skin: Skin color, texture, turgor normal. No " rashes or lesions  Neurologic: Grossly normal     Additional Information        Ovidio Villanueva MD  Internal Medicine  Contact me at [phone]     Additional Information   Current Outpatient Medications   Medication Sig     aspirin 325 MG tablet Take 325 mg by mouth daily.     celecoxib (CELEBREX) 100 MG capsule Take 1 capsule (100 mg total) by mouth daily.     GLY/DIMETH/PETROLAT,WHT/WATER (MOISTURIZING CREAM TOP) Apply topically as needed.     multivitamin (MULTIVITAMIN) per tablet Take 1 tablet by mouth daily.     amLODIPine (NORVASC) 2.5 MG tablet Take 1 tablet (2.5 mg total) by mouth daily.     No Known Allergies  Social History     Tobacco Use     Smoking status: Never Smoker     Smokeless tobacco: Never Used   Substance Use Topics     Alcohol use: No     Drug use: No         Time: total time spent with the patient was 25 minutes of which >50% was spent in counseling and coordination of care

## 2021-06-24 NOTE — PROGRESS NOTES
Office Visit - Follow Up   Rian Vasquez   89 y.o. male    Date of Visit: 3/11/2019    Chief Complaint   Patient presents with     Follow-up     not fasting         Assessment and Plan   1. Essential hypertension  Was seen 2 weeks ago for his increased blood pressure.  Also had some headaches and mild dizziness.  Was restarted with a blood pressure medication, this time using amlodipine low-dose 2.5 mg daily. His blood pressure is now controlled. Continue amlodipine. Tolerating this med well without side effects.  He is to take diuretic, hydrochlorothiazide but he stopped it due to recurring hypokalemia.    2. Pure hypercholesterolemia  Has pure hypercholesterolemia.  Last lipids were borderline.  Because of his advanced age of 89 he does not need lipid-lowering medication.      Follow up in 1 month.     History of Present Illness   This 89 y.o. old male is here for follow-up.  Was seen 2 weeks ago for increased blood pressure.  Also had some headaches and mild dizziness.  Was restarted with a new blood pressure medication, amlodipine low-dose 2.5 mg daily.  His blood pressure is now controlled.  Dizziness and headaches also are resolved.  Used to take diuretic hydrochlorothiazide.  But he stopped this on his own due to recurring hypokalemia.  Has pure hypercholesterolemia.  Not on medication because of his advanced age of 89.  Overall, he is doing and feeling well despite his advanced age of 89.  Still remains very independent and does all his activities of daily living without any help.    Review of Systems   A 12 point comprehensive review of systems was negative except as noted..     Medications, Allergies and Problem List   Reviewed and updated             Chief Complaint   Follow-up (not fasting )       Patient Profile   Social History     Social History Narrative     Not on file        Past Medical History   Patient Active Problem List   Diagnosis     Dizziness     Hyperlipidemia     Essential  "hypertension     Hypokalemia     Bandemia     Pure hypercholesterolemia       Past Surgical History  He has a past surgical history that includes pr transurethral elec-surg prostatectom and pr removal gallbladder.       Medications and Allergies   Current Outpatient Medications   Medication Sig     amLODIPine (NORVASC) 2.5 MG tablet Take 1 tablet (2.5 mg total) by mouth daily.     aspirin 325 MG tablet Take 325 mg by mouth daily.     celecoxib (CELEBREX) 100 MG capsule Take 1 capsule (100 mg total) by mouth daily.     GLY/DIMETH/PETROLAT,WHT/WATER (MOISTURIZING CREAM TOP) Apply topically as needed.     multivitamin (MULTIVITAMIN) per tablet Take 1 tablet by mouth daily.     No Known Allergies     Family and Social History   No family history on file.     Social History     Tobacco Use     Smoking status: Never Smoker     Smokeless tobacco: Never Used   Substance Use Topics     Alcohol use: No     Drug use: No        Physical Exam       Physical Exam  /80   Pulse 98   Ht 5' 7\" (1.702 m)   Wt 166 lb (75.3 kg)   SpO2 96%   BMI 26.00 kg/m    General appearance: alert, appears stated age, cooperative and no distress  Head: Normocephalic, without obvious abnormality, atraumatic  Throat: lips, mucosa, and tongue normal; teeth and gums normal  Neck: no adenopathy, no carotid bruit, no JVD, supple, symmetrical, trachea midline and thyroid not enlarged, symmetric, no tenderness/mass/nodules  Lungs: clear to auscultation bilaterally  Heart: regular rate and rhythm, S1, S2 normal, no murmur, click, rub or gallop  Abdomen: soft, non-tender; bowel sounds normal; no masses,  no organomegaly  Extremities: extremities normal, atraumatic, no cyanosis or edema  Skin: Skin color, texture, turgor normal. No rashes or lesions  Neurologic: Grossly normal     Additional Information        Ovidio Villanueva MD  Internal Medicine  Contact me at [phone]     Additional Information   Current Outpatient Medications   Medication " Sig     amLODIPine (NORVASC) 2.5 MG tablet Take 1 tablet (2.5 mg total) by mouth daily.     aspirin 325 MG tablet Take 325 mg by mouth daily.     celecoxib (CELEBREX) 100 MG capsule Take 1 capsule (100 mg total) by mouth daily.     GLY/DIMETH/PETROLAT,WHT/WATER (MOISTURIZING CREAM TOP) Apply topically as needed.     multivitamin (MULTIVITAMIN) per tablet Take 1 tablet by mouth daily.     No Known Allergies  Social History     Tobacco Use     Smoking status: Never Smoker     Smokeless tobacco: Never Used   Substance Use Topics     Alcohol use: No     Drug use: No         Time: total time spent with the patient was 25 minutes of which >50% was spent in counseling and coordination of care

## 2021-08-03 PROBLEM — B37.0 THRUSH OF MOUTH AND ESOPHAGUS (H): Status: RESOLVED | Noted: 2018-02-23 | Resolved: 2018-06-04

## 2021-08-03 PROBLEM — B37.81 THRUSH OF MOUTH AND ESOPHAGUS (H): Status: RESOLVED | Noted: 2018-02-23 | Resolved: 2018-06-04

## 2023-09-12 NOTE — PROGRESS NOTES
Office Visit - Follow Up   Rian Vasquez   89 y.o. male    Date of Visit: 6/14/2017    Chief Complaint   Patient presents with     Rash     Left and Right side for 3 days        Assessment and Plan   1. Rash/skin eruption  Complaining of maculopapular skin rashes on his chest back and arms which started about 3 days ago.  This are itchy.  Tried applying over-the-counter anti-itch ointment without help.  Denies using a new detergent and soap.  Does not have allergies to dogs and cats and he does not have any pets at home.  Also denies presence of any insect or bedbugs in his home.  On exam the skin rashes or eruptions suggest an allergic reaction.  Will give him prednisone 20 mg daily for 5 days and to apply triamcinolone 0.1% cream 3 times a day to the skin rashes.  - predniSONE (DELTASONE) 20 MG tablet; Take 1 tablet (20 mg total) by mouth daily for 5 days.  Dispense: 5 tablet; Refill: 0  - triamcinolone (KENALOG) 0.1 % cream; Apply topically 3 (three) times a day.  Dispense: 60 g; Refill: 0    2. Allergy  Possible allergy causing his skin rashes.  Advised to update me after finishing his prednisone in 5 days if this still persists.  - predniSONE (DELTASONE) 20 MG tablet; Take 1 tablet (20 mg total) by mouth daily for 5 days.  Dispense: 5 tablet; Refill: 0    3. Essential hypertension  Controlled.  Continue hydrochlorothiazide, amlodipine and metoprolol.    Has a living will.  Now it is completed and notarized.  Gave a copy for his chart.    Follow up as needed.       History of Present Illness   This 89 y.o. old male walked in the clinic complaining of pruritic skin rashes on his chest back and arms.  Apparently started 3 days ago out of nowhere.  Denies using new detergent, soap fragrance and reports that he does have any pets at home.  Denies seeing any bedbugs or any other insects or bugs in his home.  Reports his apartment is being checked on a regular basis because he lives in a [facility]  apartment complex.  Has hypertension controlled by his medications.  Denies shortness of breath.  Denies wheezing.  Feels well overall.    Review of Systems   A 12 point comprehensive review of systems was negative except as noted..     Medications, Allergies and Problem List   Reviewed and updated             Chief Complaint   Rash (Left and Right side for 3 days)       Patient Profile   Social History     Social History Narrative        Past Medical History   Patient Active Problem List   Diagnosis     Dizziness     Hyperlipidemia     Essential Hypertension       Past Surgical History  He has a past surgical history that includes transurethral elec-surg prostatectom and removal gallbladder.       Medications and Allergies   Current Outpatient Prescriptions   Medication Sig     amLODIPine (NORVASC) 2.5 MG tablet TAKE ONE TABLET BY MOUTH DAILY     aspirin 81 MG EC tablet Take 81 mg by mouth daily.     calcium-vitamin D 500 mg(1,250mg) -200 unit per tablet Take 1 tablet by mouth 2 (two) times a day with meals.     ferrous sulfate 325 (65 FE) MG tablet Take 325 mg by mouth daily.     hydroCHLOROthiazide (HYDRODIURIL) 25 MG tablet Take 1 tablet (25 mg total) by mouth 2 (two) times a day at 9am and 6pm.     metoprolol succinate (TOPROL-XL) 50 MG 24 hr tablet TAKE 1 TABLET BY MOUTH DAILY     multivitamin (MULTIVITAMIN) per tablet Take 1 tablet by mouth daily.     potassium chloride SA (K-DUR,KLOR-CON) 20 MEQ tablet TAKE ONE TABLET(20MEQ) DAILY     predniSONE (DELTASONE) 20 MG tablet Take 1 tablet (20 mg total) by mouth daily for 5 days.     triamcinolone (KENALOG) 0.1 % cream Apply topically 3 (three) times a day.     No Known Allergies     Family and Social History   No family history on file.     Social History   Substance Use Topics     Smoking status: Never Smoker     Smokeless tobacco: Never Used     Alcohol use No        Physical Exam       Physical Exam  /60  Pulse 64  Wt 169 lb 4 oz (76.8 kg)  BMI 28.16  kg/m2  General appearance: alert, appears stated age, cooperative and no distress  Head: Normocephalic, without obvious abnormality, atraumatic  Throat: lips, mucosa, and tongue normal; teeth and gums normal  Neck: no adenopathy, no carotid bruit, no JVD, supple, symmetrical, trachea midline and thyroid not enlarged, symmetric, no tenderness/mass/nodules  Lungs: clear to auscultation bilaterally  Heart: regular rate and rhythm, S1, S2 normal, no murmur, click, rub or gallop  Abdomen: soft, non-tender; bowel sounds normal; no masses,  no organomegaly  Extremities: extremities normal, atraumatic, no cyanosis or edema  Skin: Pruritic maculopapular skin rashes distributed randomly and diffusely on his back, chest and arms     Additional Information        Ovidio Villanueva MD  Internal Medicine  Contact me at [phone]     Additional Information   Current Outpatient Prescriptions   Medication Sig     amLODIPine (NORVASC) 2.5 MG tablet TAKE ONE TABLET BY MOUTH DAILY     aspirin 81 MG EC tablet Take 81 mg by mouth daily.     calcium-vitamin D 500 mg(1,250mg) -200 unit per tablet Take 1 tablet by mouth 2 (two) times a day with meals.     ferrous sulfate 325 (65 FE) MG tablet Take 325 mg by mouth daily.     hydroCHLOROthiazide (HYDRODIURIL) 25 MG tablet Take 1 tablet (25 mg total) by mouth 2 (two) times a day at 9am and 6pm.     metoprolol succinate (TOPROL-XL) 50 MG 24 hr tablet TAKE 1 TABLET BY MOUTH DAILY     multivitamin (MULTIVITAMIN) per tablet Take 1 tablet by mouth daily.     potassium chloride SA (K-DUR,KLOR-CON) 20 MEQ tablet TAKE ONE TABLET(20MEQ) DAILY     predniSONE (DELTASONE) 20 MG tablet Take 1 tablet (20 mg total) by mouth daily for 5 days.     triamcinolone (KENALOG) 0.1 % cream Apply topically 3 (three) times a day.     No Known Allergies  Social History   Substance Use Topics     Smoking status: Never Smoker     Smokeless tobacco: Never Used     Alcohol use No         Time: total time spent with the  patient was 25 minutes of which >50% was spent in counseling and coordination of care      (1) More than 48 hours/None